# Patient Record
Sex: MALE | Race: WHITE
[De-identification: names, ages, dates, MRNs, and addresses within clinical notes are randomized per-mention and may not be internally consistent; named-entity substitution may affect disease eponyms.]

---

## 2018-07-25 ENCOUNTER — HOSPITAL ENCOUNTER (OUTPATIENT)
Age: 53
End: 2018-07-25
Payer: COMMERCIAL

## 2018-07-25 DIAGNOSIS — G47.33: Primary | ICD-10-CM

## 2018-07-25 PROCEDURE — 95811 POLYSOM 6/>YRS CPAP 4/> PARM: CPT

## 2018-08-10 ENCOUNTER — HOSPITAL ENCOUNTER (OUTPATIENT)
Age: 53
End: 2018-08-10
Payer: COMMERCIAL

## 2018-08-10 DIAGNOSIS — Z00.00: Primary | ICD-10-CM

## 2018-08-10 DIAGNOSIS — E55.9: ICD-10-CM

## 2018-08-10 DIAGNOSIS — E03.9: ICD-10-CM

## 2018-08-10 LAB
ALANINE AMINOTRANSFER ALT/SGPT: 30 U/L (ref 16–61)
ALBUMIN SERPL-MCNC: 3.4 G/DL (ref 3.2–5)
ALKALINE PHOSPHATASE: 79 U/L (ref 45–117)
ANION GAP: 5 (ref 5–15)
AST(SGOT): 21 U/L (ref 15–37)
BUN SERPL-MCNC: 9 MG/DL (ref 7–18)
BUN/CREAT RATIO: 7.3 RATIO (ref 10–20)
CALCIUM SERPL-MCNC: 8.2 MG/DL (ref 8.5–10.1)
CARBON DIOXIDE: 29 MMOL/L (ref 21–32)
CHLORIDE: 106 MMOL/L (ref 98–107)
CHOLEST SERPL-MCNC: 159 MG/DL
DEPRECATED RDW RBC: 40.6 FL (ref 35.1–43.9)
ERYTHROCYTE [DISTWIDTH] IN BLOOD: 12.7 % (ref 11.6–14.6)
EST GLOM FILT RATE - AFR AMER: 79 ML/MIN (ref 60–?)
GLOBULIN: 3.7 G/DL (ref 2.2–4.2)
GLUCOSE: 99 MG/DL (ref 74–106)
HCT VFR BLD AUTO: 40.5 % (ref 40–54)
HEMOGLOBIN: 13.4 G/DL (ref 13–16.5)
HGB BLD-MCNC: 13.4 G/DL (ref 13–16.5)
MCV RBC: 89 FL (ref 80–94)
MEAN CORP HGB CONC: 33.1 G/GL (ref 32–36)
MEAN PLATELET VOL.: 10.6 FL (ref 6.2–12)
PLATELET # BLD: 250 K/MM3 (ref 150–450)
PLATELET COUNT: 250 K/MM3 (ref 150–450)
POTASSIUM: 3.7 MMOL/L (ref 3.5–5.1)
RBC # BLD AUTO: 4.55 M/MM3 (ref 4.6–6.2)
RBC DISTRIBUTION WIDTH CV: 12.7 % (ref 11.6–14.6)
RBC DISTRIBUTION WIDTH SD: 40.6 FL (ref 35.1–43.9)
SCAN INDICATED ON CBC? Y/N: NO
TRIGLYCERIDES: 140 MG/DL
VITAMIN D,25 HYDROXY: 26 NG/ML (ref 29.95–100.01)
VLDLC SERPL-MCNC: 28 MG/DL (ref 5–40)
WBC # BLD AUTO: 6.9 K/MM3 (ref 4.4–11)
WHITE BLOOD COUNT: 6.9 K/MM3 (ref 4.4–11)

## 2018-08-10 PROCEDURE — 84443 ASSAY THYROID STIM HORMONE: CPT

## 2018-08-10 PROCEDURE — 80053 COMPREHEN METABOLIC PANEL: CPT

## 2018-08-10 PROCEDURE — 80061 LIPID PANEL: CPT

## 2018-08-10 PROCEDURE — 36415 COLL VENOUS BLD VENIPUNCTURE: CPT

## 2018-08-10 PROCEDURE — 82306 VITAMIN D 25 HYDROXY: CPT

## 2018-08-10 PROCEDURE — 85027 COMPLETE CBC AUTOMATED: CPT

## 2020-09-23 ENCOUNTER — HOSPITAL ENCOUNTER (OUTPATIENT)
Age: 55
End: 2020-09-23
Payer: COMMERCIAL

## 2020-09-23 VITALS — BODY MASS INDEX: 34.8 KG/M2

## 2020-09-23 DIAGNOSIS — U07.1: ICD-10-CM

## 2020-09-23 DIAGNOSIS — R06.02: ICD-10-CM

## 2020-09-23 DIAGNOSIS — I25.10: ICD-10-CM

## 2020-09-23 DIAGNOSIS — E03.9: ICD-10-CM

## 2020-09-23 DIAGNOSIS — Z00.00: Primary | ICD-10-CM

## 2020-09-23 LAB
ALANINE AMINOTRANSFER ALT/SGPT: 30 U/L (ref 16–61)
ALBUMIN SERPL-MCNC: 3.7 G/DL (ref 3.2–5)
ALKALINE PHOSPHATASE: 66 U/L (ref 45–117)
ANION GAP: 2 (ref 5–15)
AST(SGOT): 21 U/L (ref 15–37)
BUN SERPL-MCNC: 20 MG/DL (ref 7–18)
BUN/CREAT RATIO: 17.7 RATIO (ref 10–20)
CALCIUM SERPL-MCNC: 8.6 MG/DL (ref 8.5–10.1)
CARBON DIOXIDE: 29 MMOL/L (ref 21–32)
CHLORIDE: 108 MMOL/L (ref 98–107)
CHOLEST SERPL-MCNC: 143 MG/DL
CRP, HIGH SENSITIVITY CARDIAC: 3.4 MG/L
DEPRECATED RDW RBC: 40.4 FL (ref 35.1–43.9)
ERYTHROCYTE [DISTWIDTH] IN BLOOD: 12.2 % (ref 11.6–14.6)
EST GLOM FILT RATE - AFR AMER: 87 ML/MIN (ref 60–?)
GLOBULIN: 3.3 G/DL (ref 2.2–4.2)
GLUCOSE: 83 MG/DL (ref 74–106)
HCT VFR BLD AUTO: 39.7 % (ref 40–54)
HEMOGLOBIN: 13 G/DL (ref 13–16.5)
HGB BLD-MCNC: 13 G/DL (ref 13–16.5)
MCV RBC: 90.8 FL (ref 80–94)
MEAN CORP HGB CONC: 32.7 G/DL (ref 32–36)
MEAN PLATELET VOL.: 11.1 FL (ref 6.2–12)
PLATELET # BLD: 251 K/MM3 (ref 150–450)
PLATELET COUNT: 251 K/MM3 (ref 150–450)
POTASSIUM: 4.4 MMOL/L (ref 3.5–5.1)
RBC # BLD AUTO: 4.37 M/MM3 (ref 4.6–6.2)
RBC DISTRIBUTION WIDTH CV: 12.2 % (ref 11.6–14.6)
RBC DISTRIBUTION WIDTH SD: 40.4 FL (ref 35.1–43.9)
TRIGLYCERIDES: 91 MG/DL
VLDLC SERPL-MCNC: 18 MG/DL (ref 5–40)
WBC # BLD AUTO: 6.8 K/MM3 (ref 4.4–11)
WHITE BLOOD COUNT: 6.8 K/MM3 (ref 4.4–11)

## 2020-09-23 PROCEDURE — 86141 C-REACTIVE PROTEIN HS: CPT

## 2020-09-23 PROCEDURE — 80053 COMPREHEN METABOLIC PANEL: CPT

## 2020-09-23 PROCEDURE — 84443 ASSAY THYROID STIM HORMONE: CPT

## 2020-09-23 PROCEDURE — 80061 LIPID PANEL: CPT

## 2020-09-23 PROCEDURE — 85652 RBC SED RATE AUTOMATED: CPT

## 2020-09-23 PROCEDURE — 85027 COMPLETE CBC AUTOMATED: CPT

## 2020-09-23 PROCEDURE — 36415 COLL VENOUS BLD VENIPUNCTURE: CPT

## 2020-10-16 ENCOUNTER — HOSPITAL ENCOUNTER (OUTPATIENT)
Dept: HOSPITAL 100 - PSN | Age: 55
Discharge: HOME | End: 2020-10-16
Payer: COMMERCIAL

## 2020-10-16 VITALS — BODY MASS INDEX: 34.8 KG/M2

## 2020-10-16 DIAGNOSIS — R06.02: ICD-10-CM

## 2020-10-16 DIAGNOSIS — I25.10: Primary | ICD-10-CM

## 2020-10-16 DIAGNOSIS — U07.1: ICD-10-CM

## 2020-10-16 PROCEDURE — 93306 TTE W/DOPPLER COMPLETE: CPT

## 2020-10-16 PROCEDURE — C8929 TTE W OR WO FOL WCON,DOPPLER: HCPCS

## 2020-10-16 PROCEDURE — A4216 STERILE WATER/SALINE, 10 ML: HCPCS

## 2020-10-16 PROCEDURE — 93005 ELECTROCARDIOGRAM TRACING: CPT

## 2024-02-29 ENCOUNTER — OFFICE VISIT (OUTPATIENT)
Dept: ORTHOPEDIC SURGERY | Facility: CLINIC | Age: 59
End: 2024-02-29
Payer: COMMERCIAL

## 2024-02-29 VITALS — WEIGHT: 265 LBS | HEIGHT: 71 IN | BODY MASS INDEX: 37.1 KG/M2

## 2024-02-29 DIAGNOSIS — M48.061 DEGENERATIVE LUMBAR SPINAL STENOSIS: Primary | ICD-10-CM

## 2024-02-29 PROCEDURE — 99205 OFFICE O/P NEW HI 60 MIN: CPT | Performed by: ORTHOPAEDIC SURGERY

## 2024-02-29 ASSESSMENT — PAIN SCALES - GENERAL: PAINLEVEL: 8

## 2024-02-29 NOTE — LETTER
February 29, 2024     Eduin Dial MD  [facility] [employer] [facility] [id number]    Patient: Sin Jenkins   YOB: 1965   Date of Visit: 2/29/2024       Dear Dr. Eduin Dial MD:    Thank you for referring Sin Jenkins to me for evaluation. Below are my notes for this consultation.  If you have questions, please do not hesitate to call me. I look forward to following your patient along with you.       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    He had a remote L3-4 decompression and fusion by Dr. Dutch Meraz in 2011 that he did very well with.  He has had intermittent low back problems over time.    However, in October last year, he developed rather severe and persistent radiating left leg pain.    He was hospitalized in October with left olecranon bursitis that required surgical debridement.  The infecting organism was MRSA.  He recovered from this but around that time developed rather severe radiating left leg pain.  He did have an MRI at that time which revealed facet arthrosis and a synovial cyst at L5-S1.    Since that time, he has been involved in a therapy program on his own doing primarily Castillo type exercises.    Attempted but that caused mental change.  He currently is on.    He has had 2 injections that provided transient relief.    His general health is excellent.    His wife is also  and  and she joins us via family, social, and medical histories are obtained and reviewed.  Cell phone to participate in the discussion.    Family, social, and medical histories are obtained and reviewed.    30-point, patient-recorded Review of Systems is personally obtained and reviewed. Inclusive  is no history of weight loss, change in appetite, recent change in activity level, change in bowel or bladder habits, fevers, chills, malaise, or night pain.    On exam husky appearing man no acute distress.  He has a long midline lumbar scar.  Limited flexion.  Painless motion both hips.    Strength is intact on the right.  On the left he does have weakness in ankle dorsiflexion, 3/5.  No hyperreflexia.    Plain films show pedicle screw instrumentation at L3-4 with a healed interbody fusion.  No loosening of his instrumentation.  He has a very degenerative L2-3 disc space above this.  He has a slight anterolisthesis of L4 on 5 that increases with flexion.  The disc space at L5-S1 is fairly well-preserved.    His MRI shows mild stenosis at L4-5 but there is significant fluid within both facet joints at L4-5.  L5-S1 there is a large synovial cyst emanating from the left-sided L5-S1 facet joint and this causes significant lateral recess stenosis.  There is quite a bit of fluid and again degenerative changes, obviously, at that left L5-S1 facet joint.    Impression: He has become persistently symptomatic with severe left lumbar radiculopathy over the last 5 months.  His symptoms correlate with the L5-S1 neural compression.  He has had appropriate treatment at this point.    We have discussed the different treatment options both surgical and nonsurgical.  He certainly could continue with an exercise program, use further anti-inflammatories, and work on weight reduction.    Alternatively, surgery is an option for him.  It would be elective.    There would be 2 options surgically.  One option would be a relatively straightforward laminotomy and removal of the synovial cyst at L5-S1.  I would not recommend this approach.  With his underlying facet arthrosis, I think there would be a high likelihood of recurrence or potentially instability.  As well, he has a spondylolisthesis at the intervening L4-5 level and there is some  stenosis associated with this.  As such, my surgical approach would include a decompression at the L4-5 level and L5-S1 with removal of the synovial cyst on the left and extension of his fusion from L3 to the sacrum with a TLIF at both L4-5 and L5-S1.  Obviously, this would be a significant undertaking for him.  However, if he chose surgery, I would take that approach to give him the best opportunity of relief of his disabling left lumbar radiculopathy and lessen the likelihood of recurring symptoms in the future.    Certainly would be reasonable to obtain other surgical opinions given his somewhat unusual appearance.    He has a good understanding of this.    He will keep me updated on his progress and I would be happy to see him back as needed.    ** Dictated with voice recognition software and not immediately reviewed for errors in grammar and/or spelling **

## 2024-02-29 NOTE — PROGRESS NOTES
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    He had a remote L3-4 decompression and fusion by Dr. Dutch Meraz in 2011 that he did very well with.  He has had intermittent low back problems over time.    However, in October last year, he developed rather severe and persistent radiating left leg pain.    He was hospitalized in October with left olecranon bursitis that required surgical debridement.  The infecting organism was MRSA.  He recovered from this but around that time developed rather severe radiating left leg pain.  He did have an MRI at that time which revealed facet arthrosis and a synovial cyst at L5-S1.    Since that time, he has been involved in a therapy program on his own doing primarily Castillo type exercises.    Attempted but that caused mental change.  He currently is on.    He has had 2 injections that provided transient relief.    His general health is excellent.    His wife is also  and  and she joins us via family, social, and medical histories are obtained and reviewed.  Cell phone to participate in the discussion.    Family, social, and medical histories are obtained and reviewed.    30-point, patient-recorded Review of Systems is personally obtained and reviewed. Inclusive is no history of weight loss, change in appetite, recent change in activity level, change in bowel or bladder habits, fevers, chills, malaise, or night pain.    On exam husky appearing man no acute distress.  He has a long midline lumbar scar.  Limited flexion.  Painless motion both hips.    Strength is intact on the right.  On the left he does have weakness in ankle dorsiflexion, 3/5.  No hyperreflexia.    Plain films show pedicle screw instrumentation at L3-4 with a healed interbody fusion.  No loosening of his instrumentation.  He has a very degenerative L2-3 disc space above  this.  He has a slight anterolisthesis of L4 on 5 that increases with flexion.  The disc space at L5-S1 is fairly well-preserved.    His MRI shows mild stenosis at L4-5 but there is significant fluid within both facet joints at L4-5.  L5-S1 there is a large synovial cyst emanating from the left-sided L5-S1 facet joint and this causes significant lateral recess stenosis.  There is quite a bit of fluid and again degenerative changes, obviously, at that left L5-S1 facet joint.    Impression: He has become persistently symptomatic with severe left lumbar radiculopathy over the last 5 months.  His symptoms correlate with the L5-S1 neural compression.  He has had appropriate treatment at this point.    We have discussed the different treatment options both surgical and nonsurgical.  He certainly could continue with an exercise program, use further anti-inflammatories, and work on weight reduction.    Alternatively, surgery is an option for him.  It would be elective.    There would be 2 options surgically.  One option would be a relatively straightforward laminotomy and removal of the synovial cyst at L5-S1.  I would not recommend this approach.  With his underlying facet arthrosis, I think there would be a high likelihood of recurrence or potentially instability.  As well, he has a spondylolisthesis at the intervening L4-5 level and there is some stenosis associated with this.  As such, my surgical approach would include a decompression at the L4-5 level and L5-S1 with removal of the synovial cyst on the left and extension of his fusion from L3 to the sacrum with a TLIF at both L4-5 and L5-S1.  Obviously, this would be a significant undertaking for him.  However, if he chose surgery, I would take that approach to give him the best opportunity of relief of his disabling left lumbar radiculopathy and lessen the likelihood of recurring symptoms in the future.    Certainly would be reasonable to obtain other surgical  opinions given his somewhat unusual appearance.    He has a good understanding of this.    He will keep me updated on his progress and I would be happy to see him back as needed.    ** Dictated with voice recognition software and not immediately reviewed for errors in grammar and/or spelling **

## 2024-03-20 ENCOUNTER — APPOINTMENT (OUTPATIENT)
Dept: ORTHOPEDIC SURGERY | Facility: CLINIC | Age: 59
End: 2024-03-20
Payer: COMMERCIAL

## 2024-03-26 ENCOUNTER — APPOINTMENT (OUTPATIENT)
Dept: ORTHOPEDIC SURGERY | Facility: CLINIC | Age: 59
End: 2024-03-26
Payer: COMMERCIAL

## 2025-04-23 ENCOUNTER — HOSPITAL ENCOUNTER (OUTPATIENT)
Dept: HOSPITAL 100 - MTRAD | Age: 60
Discharge: HOME | End: 2025-04-23
Payer: COMMERCIAL

## 2025-04-23 DIAGNOSIS — M25.562: Primary | ICD-10-CM

## 2025-04-23 DIAGNOSIS — G89.29: ICD-10-CM

## 2025-04-23 PROCEDURE — 73564 X-RAY EXAM KNEE 4 OR MORE: CPT

## 2025-05-08 ENCOUNTER — HOSPITAL ENCOUNTER (OUTPATIENT)
Dept: HOSPITAL 100 - LAB | Age: 60
Discharge: HOME | End: 2025-05-08
Payer: COMMERCIAL

## 2025-05-08 DIAGNOSIS — Z01.818: Primary | ICD-10-CM

## 2025-05-08 LAB
ANION GAP SERPL CALC-SCNC: 10 MMOL/L (ref 5–15)
APTT PPP: 26.4 SECONDS (ref 24.1–36.2)
BUN SERPL-MCNC: 17 MG/DL (ref 4–19)
BUN/CREAT SERPL: 18.2 RATIO (ref 10–20)
CALCIUM SERPL-MCNC: 9.1 MG/DL (ref 7.6–11)
CHLORIDE: 105 MMOL/L (ref 98–108)
CO2 BLDCV-SCNC: 25.5 MMOL/L (ref 21–32)
DEPRECATED RDW RBC: 44.2 FL (ref 35.1–43.9)
ERYTHROCYTE [DISTWIDTH] IN BLOOD: 12.9 % (ref 11.6–14.6)
GLUCOSE SERPL-MCNC: 96 MG/DL (ref 70–99)
HCT VFR BLD AUTO: 42.3 % (ref 40–54)
HGB BLD-MCNC: 13.8 G/DL (ref 13–16.5)
MAGNESIUM SPEC-MCNC: 2.3 MG/DL (ref 1.5–2.2)
MCV RBC: 93.8 FL (ref 80–94)
MEAN CORP HGB CONC: 32.6 G/DL (ref 32–36)
MEAN PLATELET VOL.: 11.2 FL (ref 6.2–12)
NRBC FLAGGED BY ANALYZER: 0 % (ref 0–5)
PLATELET # BLD: 252 K/MM3 (ref 150–450)
POTASSIUM SPEC-MCNC: 4.3 MMOL/L (ref 3.3–5.1)
PROTHROMBIN TIME (PROTIME)PT.: 13.8 SECONDS (ref 11.7–14.9)
RBC # BLD AUTO: 4.51 M/MM3 (ref 4.6–6.2)
WBC # BLD AUTO: 5.8 K/MM3 (ref 4.4–11)

## 2025-05-08 PROCEDURE — 85025 COMPLETE CBC W/AUTO DIFF WBC: CPT

## 2025-05-08 PROCEDURE — 86901 BLOOD TYPING SEROLOGIC RH(D): CPT

## 2025-05-08 PROCEDURE — 87077 CULTURE AEROBIC IDENTIFY: CPT

## 2025-05-08 PROCEDURE — 87081 CULTURE SCREEN ONLY: CPT

## 2025-05-08 PROCEDURE — 80048 BASIC METABOLIC PNL TOTAL CA: CPT

## 2025-05-08 PROCEDURE — 86850 RBC ANTIBODY SCREEN: CPT

## 2025-05-08 PROCEDURE — 85610 PROTHROMBIN TIME: CPT

## 2025-05-08 PROCEDURE — 86900 BLOOD TYPING SEROLOGIC ABO: CPT

## 2025-05-08 PROCEDURE — 83036 HEMOGLOBIN GLYCOSYLATED A1C: CPT

## 2025-05-08 PROCEDURE — 85730 THROMBOPLASTIN TIME PARTIAL: CPT

## 2025-05-08 PROCEDURE — 83735 ASSAY OF MAGNESIUM: CPT

## 2025-05-08 PROCEDURE — 36415 COLL VENOUS BLD VENIPUNCTURE: CPT

## 2025-05-08 PROCEDURE — 82985 ASSAY OF GLYCATED PROTEIN: CPT

## 2025-05-13 ENCOUNTER — HOSPITAL ENCOUNTER (OUTPATIENT)
Dept: HOSPITAL 100 - CT | Age: 60
Discharge: HOME | End: 2025-05-13
Payer: COMMERCIAL

## 2025-05-13 DIAGNOSIS — M17.11: Primary | ICD-10-CM

## 2025-05-13 PROCEDURE — 73700 CT LOWER EXTREMITY W/O DYE: CPT

## 2025-05-20 ENCOUNTER — HOSPITAL ENCOUNTER (OUTPATIENT)
Dept: HOSPITAL 100 - SDC | Age: 60
Discharge: HOME | End: 2025-05-20
Payer: COMMERCIAL

## 2025-05-20 VITALS
HEART RATE: 70 BPM | RESPIRATION RATE: 11 BRPM | OXYGEN SATURATION: 99 % | DIASTOLIC BLOOD PRESSURE: 66 MMHG | SYSTOLIC BLOOD PRESSURE: 139 MMHG

## 2025-05-20 VITALS
DIASTOLIC BLOOD PRESSURE: 88 MMHG | SYSTOLIC BLOOD PRESSURE: 139 MMHG | HEART RATE: 68 BPM | RESPIRATION RATE: 10 BRPM | TEMPERATURE: 97 F | OXYGEN SATURATION: 100 %

## 2025-05-20 VITALS
OXYGEN SATURATION: 95 % | RESPIRATION RATE: 16 BRPM | DIASTOLIC BLOOD PRESSURE: 49 MMHG | SYSTOLIC BLOOD PRESSURE: 139 MMHG | TEMPERATURE: 97.6 F | HEART RATE: 73 BPM

## 2025-05-20 VITALS
HEART RATE: 54 BPM | DIASTOLIC BLOOD PRESSURE: 63 MMHG | OXYGEN SATURATION: 98 % | SYSTOLIC BLOOD PRESSURE: 117 MMHG | RESPIRATION RATE: 13 BRPM

## 2025-05-20 VITALS
OXYGEN SATURATION: 100 % | HEART RATE: 54 BPM | RESPIRATION RATE: 8 BRPM | DIASTOLIC BLOOD PRESSURE: 88 MMHG | SYSTOLIC BLOOD PRESSURE: 139 MMHG

## 2025-05-20 VITALS
DIASTOLIC BLOOD PRESSURE: 73 MMHG | SYSTOLIC BLOOD PRESSURE: 139 MMHG | OXYGEN SATURATION: 100 % | HEART RATE: 66 BPM | RESPIRATION RATE: 9 BRPM

## 2025-05-20 VITALS
DIASTOLIC BLOOD PRESSURE: 88 MMHG | SYSTOLIC BLOOD PRESSURE: 136 MMHG | HEART RATE: 70 BPM | RESPIRATION RATE: 9 BRPM | OXYGEN SATURATION: 98 %

## 2025-05-20 VITALS
SYSTOLIC BLOOD PRESSURE: 120 MMHG | DIASTOLIC BLOOD PRESSURE: 88 MMHG | OXYGEN SATURATION: 100 % | RESPIRATION RATE: 16 BRPM | HEART RATE: 80 BPM | TEMPERATURE: 96.98 F

## 2025-05-20 VITALS
HEART RATE: 60 BPM | DIASTOLIC BLOOD PRESSURE: 88 MMHG | RESPIRATION RATE: 16 BRPM | SYSTOLIC BLOOD PRESSURE: 139 MMHG | OXYGEN SATURATION: 99 %

## 2025-05-20 VITALS
TEMPERATURE: 97.88 F | RESPIRATION RATE: 16 BRPM | HEART RATE: 75 BPM | BODY MASS INDEX: 33.8 KG/M2 | DIASTOLIC BLOOD PRESSURE: 88 MMHG | OXYGEN SATURATION: 100 % | SYSTOLIC BLOOD PRESSURE: 139 MMHG

## 2025-05-20 VITALS
OXYGEN SATURATION: 97 % | SYSTOLIC BLOOD PRESSURE: 115 MMHG | HEART RATE: 71 BPM | RESPIRATION RATE: 16 BRPM | DIASTOLIC BLOOD PRESSURE: 68 MMHG | TEMPERATURE: 97.52 F

## 2025-05-20 DIAGNOSIS — M17.0: Primary | ICD-10-CM

## 2025-05-20 DIAGNOSIS — R29.898: ICD-10-CM

## 2025-05-20 DIAGNOSIS — M54.16: ICD-10-CM

## 2025-05-20 PROCEDURE — 88304 TISSUE EXAM BY PATHOLOGIST: CPT

## 2025-05-20 PROCEDURE — 73560 X-RAY EXAM OF KNEE 1 OR 2: CPT

## 2025-05-20 PROCEDURE — C1776 JOINT DEVICE (IMPLANTABLE): HCPCS

## 2025-05-20 PROCEDURE — 97161 PT EVAL LOW COMPLEX 20 MIN: CPT

## 2025-05-20 PROCEDURE — 88311 DECALCIFY TISSUE: CPT

## 2025-05-20 PROCEDURE — 0SRC0JZ REPLACEMENT OF RIGHT KNEE JOINT WITH SYNTHETIC SUBSTITUTE, OPEN APPROACH: ICD-10-PCS | Performed by: ORTHOPAEDIC SURGERY

## 2025-05-20 PROCEDURE — 01402 ANES OPN/ARTH TOT KNE ARTHRP: CPT

## 2025-05-20 PROCEDURE — 93005 ELECTROCARDIOGRAM TRACING: CPT

## 2025-05-20 PROCEDURE — 27447 TOTAL KNEE ARTHROPLASTY: CPT

## 2025-05-20 PROCEDURE — 82962 GLUCOSE BLOOD TEST: CPT

## 2025-05-20 RX ADMIN — CEFAZOLIN 150 GM: 10 INJECTION, POWDER, FOR SOLUTION INTRAVENOUS at 16:45

## 2025-05-20 RX ADMIN — VANCOMYCIN HYDROCHLORIDE 250 MG: 1 INJECTION, POWDER, LYOPHILIZED, FOR SOLUTION INTRAVENOUS at 09:30

## 2025-05-20 RX ADMIN — TRANEXAMIC ACID 660 MG: 100 INJECTION, SOLUTION INTRAVENOUS at 11:25

## 2025-05-20 RX ADMIN — TRANEXAMIC ACID 660 MG: 100 INJECTION, SOLUTION INTRAVENOUS at 11:05

## 2025-05-20 RX ADMIN — EPINEPHRINE 1 MG: 1 INJECTION INTRAMUSCULAR; INTRAVENOUS; SUBCUTANEOUS at 12:06

## 2025-05-20 RX ADMIN — KETOROLAC TROMETHAMINE 30 MG: 30 INJECTION, SOLUTION INTRAMUSCULAR; INTRAVENOUS at 14:22

## 2025-05-20 RX ADMIN — CEFAZOLIN 150 GM: 10 INJECTION, POWDER, FOR SOLUTION INTRAVENOUS at 11:00

## 2025-05-20 RX ADMIN — SODIUM CHLORIDE 10 ML: 9 INJECTION, SOLUTION INTRAMUSCULAR; INTRAVENOUS; SUBCUTANEOUS at 12:06

## 2025-05-20 RX ADMIN — MAGNESIUM SULFATE HEPTAHYDRATE 408 GM: 500 INJECTION, SOLUTION INTRAMUSCULAR; INTRAVENOUS at 10:10

## 2025-05-20 RX ADMIN — SCOPOLAMINE 1 PATCH: 1.5 PATCH, EXTENDED RELEASE TRANSDERMAL at 10:07

## 2025-06-11 ENCOUNTER — HOSPITAL ENCOUNTER (OUTPATIENT)
Dept: HOSPITAL 100 - LAB | Age: 60
Discharge: HOME | End: 2025-06-11
Payer: COMMERCIAL

## 2025-06-11 DIAGNOSIS — Z13.6: ICD-10-CM

## 2025-06-11 DIAGNOSIS — Z12.5: Primary | ICD-10-CM

## 2025-06-11 DIAGNOSIS — E55.9: ICD-10-CM

## 2025-06-11 DIAGNOSIS — R68.82: ICD-10-CM

## 2025-06-11 LAB
ALBUMIN SERPL-MCNC: 4.3 G/DL (ref 3.5–5)
ALBUMIN/GLOB SERPL: 1.4 RATIO (ref 0.9–2.4)
ALP SERPL-CCNC: 104 U/L (ref 40–129)
ALT SERPL-CCNC: 14 U/L (ref ?–46)
ANION GAP SERPL CALC-SCNC: 13 MMOL/L (ref 5–15)
AST(SGOT): 20 U/L (ref ?–37)
BUN SERPL-MCNC: 13 MG/DL (ref 4–19)
BUN/CREAT SERPL: 14.6 RATIO (ref 10–20)
CALCIUM SERPL-MCNC: 9.5 MG/DL (ref 7.6–11)
CHLORIDE: 103 MMOL/L (ref 98–108)
CHOLEST SERPL-MCNC: 161 MG/DL (ref ?–200)
CHOLESTEROL:HDL RATIO SCREEN: 3.4
CO2 BLDCV-SCNC: 22.8 MMOL/L (ref 21–32)
CREAT SERPL-MCNC: 0.91 MG/DL (ref 0.7–1.2)
DEPRECATED RDW RBC: 41.6 FL (ref 35.1–43.9)
ERYTHROCYTE [DISTWIDTH] IN BLOOD: 12.7 % (ref 11.6–14.6)
GLOBULIN: 3 G/DL (ref 2.2–4.2)
GLUCOSE SERPL-MCNC: 90 MG/DL (ref 70–99)
HBA1C MFR BLD: 5.4 % (ref ?–5.6)
HCT VFR BLD AUTO: 39 % (ref 40–54)
HDLC SERPL-MCNC: 47 MG/DL
HGB BLD-MCNC: 12.9 G/DL (ref 13–16.5)
LOW DENSITY LIPOPROTEIN CALC.: 96 MG/DL
MCH RBC QN AUTO: 30.1 PG (ref 27–32)
MCV RBC: 91.1 FL (ref 80–94)
MEAN CORP HGB CONC: 33.1 G/DL (ref 32–36)
MEAN PLATELET VOL.: 10.7 FL (ref 6.2–12)
PLATELET # BLD: 408 K/MM3 (ref 150–450)
POTASSIUM SPEC-MCNC: 4 MMOL/L (ref 3.3–5.1)
PROTEIN, TOTAL: 7.2 G/DL (ref 5.9–8.4)
PSA,TOTAL - ANNUAL SCREEN: 5.51 NG/ML (ref 0.02–4)
RBC # BLD AUTO: 4.28 M/MM3 (ref 4.6–6.2)
SODIUM LEVEL: 138 MMOL/L (ref 133–145)
TOTAL BILIRUBIN: 0.59 MG/DL (ref 0–1.3)
TRIGLYCERIDES: 90 MG/DL
VITAMIN D,25 HYDROXY: 29 NG/ML (ref 30–100)
VLDLC SERPL-MCNC: 18 MG/DL (ref 5–40)
WBC # BLD AUTO: 5.8 K/MM3 (ref 4.4–11)
WBC NRBC COR # BLD: (no result) K/MM3 (ref 4.4–11)

## 2025-06-11 PROCEDURE — 36415 COLL VENOUS BLD VENIPUNCTURE: CPT

## 2025-06-11 PROCEDURE — 80061 LIPID PANEL: CPT

## 2025-06-11 PROCEDURE — 83036 HEMOGLOBIN GLYCOSYLATED A1C: CPT

## 2025-06-11 PROCEDURE — 80053 COMPREHEN METABOLIC PANEL: CPT

## 2025-06-11 PROCEDURE — 82306 VITAMIN D 25 HYDROXY: CPT

## 2025-06-11 PROCEDURE — 84403 ASSAY OF TOTAL TESTOSTERONE: CPT

## 2025-06-11 PROCEDURE — 84153 ASSAY OF PSA TOTAL: CPT

## 2025-06-11 PROCEDURE — 85027 COMPLETE CBC AUTOMATED: CPT

## 2025-06-11 PROCEDURE — 84402 ASSAY OF FREE TESTOSTERONE: CPT

## 2025-06-11 PROCEDURE — G0103 PSA SCREENING: HCPCS

## 2025-06-17 LAB
TESTOSTERONE, % FREE: 1.86 % (ref 1.5–4.2)
TESTOSTERONE, FREE: 6.62 NG/DL (ref 5–21)
TESTOSTERONE, TOTAL: 356 NG/DL (ref 264–916)

## 2025-07-02 ENCOUNTER — HOSPITAL ENCOUNTER (OUTPATIENT)
Dept: HOSPITAL 100 - MTRAD | Age: 60
Discharge: HOME | End: 2025-07-02
Payer: COMMERCIAL

## 2025-07-02 DIAGNOSIS — Z96.651: Primary | ICD-10-CM

## 2025-07-02 PROCEDURE — 73562 X-RAY EXAM OF KNEE 3: CPT
